# Patient Record
Sex: FEMALE | Race: ASIAN | NOT HISPANIC OR LATINO | URBAN - METROPOLITAN AREA
[De-identification: names, ages, dates, MRNs, and addresses within clinical notes are randomized per-mention and may not be internally consistent; named-entity substitution may affect disease eponyms.]

---

## 2023-03-28 ENCOUNTER — EMERGENCY (EMERGENCY)
Facility: HOSPITAL | Age: 33
LOS: 1 days | Discharge: HOME | End: 2023-03-28
Attending: STUDENT IN AN ORGANIZED HEALTH CARE EDUCATION/TRAINING PROGRAM
Payer: COMMERCIAL

## 2023-03-28 VITALS
WEIGHT: 220.02 LBS | HEART RATE: 86 BPM | DIASTOLIC BLOOD PRESSURE: 67 MMHG | SYSTOLIC BLOOD PRESSURE: 101 MMHG | RESPIRATION RATE: 19 BRPM | OXYGEN SATURATION: 100 % | TEMPERATURE: 98 F

## 2023-03-28 LAB
ALBUMIN SERPL ELPH-MCNC: 3.5 G/DL — SIGNIFICANT CHANGE UP (ref 3.5–5)
ALP SERPL-CCNC: 56 U/L — SIGNIFICANT CHANGE UP (ref 40–120)
ALT FLD-CCNC: 22 U/L DA — SIGNIFICANT CHANGE UP (ref 10–60)
ANION GAP SERPL CALC-SCNC: 5 MMOL/L — SIGNIFICANT CHANGE UP (ref 5–17)
APPEARANCE UR: CLEAR — SIGNIFICANT CHANGE UP
AST SERPL-CCNC: 12 U/L — SIGNIFICANT CHANGE UP (ref 10–40)
BACTERIA # UR AUTO: ABNORMAL /HPF
BASOPHILS # BLD AUTO: 0.03 K/UL — SIGNIFICANT CHANGE UP (ref 0–0.2)
BASOPHILS NFR BLD AUTO: 0.3 % — SIGNIFICANT CHANGE UP (ref 0–2)
BILIRUB SERPL-MCNC: 0.3 MG/DL — SIGNIFICANT CHANGE UP (ref 0.2–1.2)
BILIRUB UR-MCNC: NEGATIVE — SIGNIFICANT CHANGE UP
BLD GP AB SCN SERPL QL: SIGNIFICANT CHANGE UP
BUN SERPL-MCNC: 9 MG/DL — SIGNIFICANT CHANGE UP (ref 7–18)
CALCIUM SERPL-MCNC: 9.4 MG/DL — SIGNIFICANT CHANGE UP (ref 8.4–10.5)
CHLORIDE SERPL-SCNC: 102 MMOL/L — SIGNIFICANT CHANGE UP (ref 96–108)
CO2 SERPL-SCNC: 26 MMOL/L — SIGNIFICANT CHANGE UP (ref 22–31)
COLOR SPEC: YELLOW — SIGNIFICANT CHANGE UP
CREAT SERPL-MCNC: 0.66 MG/DL — SIGNIFICANT CHANGE UP (ref 0.5–1.3)
DIFF PNL FLD: ABNORMAL
EGFR: 117 ML/MIN/1.73M2 — SIGNIFICANT CHANGE UP
EOSINOPHIL # BLD AUTO: 0.08 K/UL — SIGNIFICANT CHANGE UP (ref 0–0.5)
EOSINOPHIL NFR BLD AUTO: 0.8 % — SIGNIFICANT CHANGE UP (ref 0–6)
EPI CELLS # UR: ABNORMAL /HPF
GLUCOSE SERPL-MCNC: 103 MG/DL — HIGH (ref 70–99)
GLUCOSE UR QL: NEGATIVE — SIGNIFICANT CHANGE UP
HCG SERPL-ACNC: HIGH MIU/ML
HCT VFR BLD CALC: 36.9 % — SIGNIFICANT CHANGE UP (ref 34.5–45)
HGB BLD-MCNC: 12.4 G/DL — SIGNIFICANT CHANGE UP (ref 11.5–15.5)
HYALINE CASTS # UR AUTO: ABNORMAL /LPF
IMM GRANULOCYTES NFR BLD AUTO: 0.2 % — SIGNIFICANT CHANGE UP (ref 0–0.9)
KETONES UR-MCNC: NEGATIVE — SIGNIFICANT CHANGE UP
LEUKOCYTE ESTERASE UR-ACNC: ABNORMAL
LYMPHOCYTES # BLD AUTO: 2.98 K/UL — SIGNIFICANT CHANGE UP (ref 1–3.3)
LYMPHOCYTES # BLD AUTO: 30 % — SIGNIFICANT CHANGE UP (ref 13–44)
MCHC RBC-ENTMCNC: 28.1 PG — SIGNIFICANT CHANGE UP (ref 27–34)
MCHC RBC-ENTMCNC: 33.6 GM/DL — SIGNIFICANT CHANGE UP (ref 32–36)
MCV RBC AUTO: 83.7 FL — SIGNIFICANT CHANGE UP (ref 80–100)
MONOCYTES # BLD AUTO: 0.62 K/UL — SIGNIFICANT CHANGE UP (ref 0–0.9)
MONOCYTES NFR BLD AUTO: 6.3 % — SIGNIFICANT CHANGE UP (ref 2–14)
NEUTROPHILS # BLD AUTO: 6.19 K/UL — SIGNIFICANT CHANGE UP (ref 1.8–7.4)
NEUTROPHILS NFR BLD AUTO: 62.4 % — SIGNIFICANT CHANGE UP (ref 43–77)
NITRITE UR-MCNC: NEGATIVE — SIGNIFICANT CHANGE UP
NRBC # BLD: 0 /100 WBCS — SIGNIFICANT CHANGE UP (ref 0–0)
PH UR: 6 — SIGNIFICANT CHANGE UP (ref 5–8)
PLATELET # BLD AUTO: 311 K/UL — SIGNIFICANT CHANGE UP (ref 150–400)
POTASSIUM SERPL-MCNC: 3.9 MMOL/L — SIGNIFICANT CHANGE UP (ref 3.5–5.3)
POTASSIUM SERPL-SCNC: 3.9 MMOL/L — SIGNIFICANT CHANGE UP (ref 3.5–5.3)
PROT SERPL-MCNC: 8.4 G/DL — HIGH (ref 6–8.3)
PROT UR-MCNC: 15 MG/DL
RBC # BLD: 4.41 M/UL — SIGNIFICANT CHANGE UP (ref 3.8–5.2)
RBC # FLD: 13.4 % — SIGNIFICANT CHANGE UP (ref 10.3–14.5)
RBC CASTS # UR COMP ASSIST: ABNORMAL /HPF (ref 0–2)
SODIUM SERPL-SCNC: 133 MMOL/L — LOW (ref 135–145)
SP GR SPEC: 1.01 — SIGNIFICANT CHANGE UP (ref 1.01–1.02)
UROBILINOGEN FLD QL: NEGATIVE — SIGNIFICANT CHANGE UP
WBC # BLD: 9.92 K/UL — SIGNIFICANT CHANGE UP (ref 3.8–10.5)
WBC # FLD AUTO: 9.92 K/UL — SIGNIFICANT CHANGE UP (ref 3.8–10.5)
WBC UR QL: ABNORMAL /HPF (ref 0–5)

## 2023-03-28 PROCEDURE — 86900 BLOOD TYPING SEROLOGIC ABO: CPT

## 2023-03-28 PROCEDURE — 76801 OB US < 14 WKS SINGLE FETUS: CPT | Mod: 26

## 2023-03-28 PROCEDURE — 81001 URINALYSIS AUTO W/SCOPE: CPT

## 2023-03-28 PROCEDURE — 99284 EMERGENCY DEPT VISIT MOD MDM: CPT

## 2023-03-28 PROCEDURE — 87186 SC STD MICRODIL/AGAR DIL: CPT

## 2023-03-28 PROCEDURE — 87184 SC STD DISK METHOD PER PLATE: CPT

## 2023-03-28 PROCEDURE — 86850 RBC ANTIBODY SCREEN: CPT

## 2023-03-28 PROCEDURE — 76801 OB US < 14 WKS SINGLE FETUS: CPT

## 2023-03-28 PROCEDURE — 87077 CULTURE AEROBIC IDENTIFY: CPT

## 2023-03-28 PROCEDURE — 80053 COMPREHEN METABOLIC PANEL: CPT

## 2023-03-28 PROCEDURE — 84702 CHORIONIC GONADOTROPIN TEST: CPT

## 2023-03-28 PROCEDURE — 86901 BLOOD TYPING SEROLOGIC RH(D): CPT

## 2023-03-28 PROCEDURE — 87086 URINE CULTURE/COLONY COUNT: CPT

## 2023-03-28 PROCEDURE — 36415 COLL VENOUS BLD VENIPUNCTURE: CPT

## 2023-03-28 PROCEDURE — 85025 COMPLETE CBC W/AUTO DIFF WBC: CPT

## 2023-03-28 NOTE — ED PROVIDER NOTE - NSFOLLOWUPINSTRUCTIONS_ED_ALL_ED_FT
Rest and stay well hydrated.     Follow up with your OBGYN in 1-3 days.     Take over the counter acetaminophen (Tylenol) 650-1000 mg every 4-6 hours as needed for pain. Do not take more than 3000 mg in a 24 hour period. Be aware many over the counter and prescription medications also contain acetaminophen (Tylenol).     Return with any new or worsening symptoms including heavy vaginal bleeding (>1 pad per hour), severe pain, chest pain, shortness of breath, dizziness, fainting, or any additional concerns.

## 2023-03-28 NOTE — ED PROVIDER NOTE - PROGRESS NOTE DETAILS
Ludin: discussed lab/imaging results with pt. Discussed pelvic rest, OBGYN follow up/return precautions, pt understood and agreeable with plan.

## 2023-03-28 NOTE — ED PROVIDER NOTE - CLINICAL SUMMARY MEDICAL DECISION MAKING FREE TEXT BOX
Ludin: 33 year old female  approximately 11 weeks pregnant (LMP ~23) presents with vaginal spotting today. Pt states she wiped and noticed a small amount of blood from vagina when wiping. Denies any fevers, chest pain, shortness of breath, abdominal pain, vomiting, diarrhea, bloody stools, black tarry stools, dysuria, headache, vision change, numbness, weakness, or rash. Denies any recent injury or trauma. Denies any aspirin or AC use. Patient states she had confirmation of pregnancy with US, pregnancy uncomplicated to date. Abdomen nontender, pt hemodynamically stable in NAD. No bleeding elsewhere to suggest coagulopathy or thrombocytopenia. Will obtain labs, imaging confirm IUP with dispo pending workup.

## 2023-03-28 NOTE — ED ADULT TRIAGE NOTE - CHIEF COMPLAINT QUOTE
Lauren Esqueda called in requesting to speak with on call to review pt labs  Paged Dr Мария Leon via TC 
11 weeks pregnant c/o spotting that started 1/2 hour ago

## 2023-03-28 NOTE — ED ADULT TRIAGE NOTE - MODE OF ARRIVAL
Detail Level: Simple Initiate Treatment: Olux e Samples Given: Olux e foam bid x2 weeks Walk in Private Auto

## 2023-03-28 NOTE — ED ADULT NURSE NOTE - NS ED NURSE DC INFO COMPLEXITY
-TLC placed during cardiac arrest removed, and new site for yvonne placement used.  Time spent on procedure independent of Critical Care time spent at the bedside Time spent on procedure independent of Critical Care time spent at the bedside -TLC placed during code blue. Once patient had ROSC, a VBG was sent from the central line, indicating a PO2 of 248 showing was in artery. Central line was threafter not accessed and was converted to arterial line on SPCU arrival  Time spent on procedure independent of Critical Care time spent at the bedside Time spent on procedure independent of Critical Care time spent at the bedside Simple: Patient demonstrates quick and easy understanding/Verbalized Understanding

## 2023-03-28 NOTE — ED ADULT NURSE NOTE - OBJECTIVE STATEMENT
Pt presents to the ED with c/o vaginal spotting today. Pt is 11 weeks pregnant. Denies fever, vomiting, or abdominal pain.

## 2023-03-28 NOTE — ED PROVIDER NOTE - OBJECTIVE STATEMENT
33 year old female  approximately 11 weeks pregnant (LMP ~23) presents with vaginal spotting today. Pt states she wiped and noticed a small amount of blood from vagina when wiping. Denies any fevers, chest pain, shortness of breath, abdominal pain, vomiting, diarrhea, bloody stools, black tarry stools, dysuria, headache, vision change, numbness, weakness, or rash. Denies any recent injury or trauma. Denies any aspirin or AC use. Patient states she had confirmation of pregnancy with US, pregnancy uncomplicated to date. Denies any additional complaints.

## 2023-03-28 NOTE — ED PROVIDER NOTE - PATIENT PORTAL LINK FT
You can access the FollowMyHealth Patient Portal offered by [facility] by registering at the following website: [email or web address]. By joining [employer]’s FollowMyHealth portal, you will also be able to view your health information using other applications (apps) compatible with our system.

## 2023-03-30 LAB
-  AMIKACIN: SIGNIFICANT CHANGE UP
-  AMPICILLIN/SULBACTAM: SIGNIFICANT CHANGE UP
-  CEFEPIME: SIGNIFICANT CHANGE UP
-  CEFTAZIDIME: SIGNIFICANT CHANGE UP
-  CEFTRIAXONE: SIGNIFICANT CHANGE UP
-  CIPROFLOXACIN: SIGNIFICANT CHANGE UP
-  GENTAMICIN: SIGNIFICANT CHANGE UP
-  IMIPENEM: SIGNIFICANT CHANGE UP
-  LEVOFLOXACIN: SIGNIFICANT CHANGE UP
-  MEROPENEM: SIGNIFICANT CHANGE UP
-  PIPERACILLIN/TAZOBACTAM: SIGNIFICANT CHANGE UP
-  TOBRAMYCIN: SIGNIFICANT CHANGE UP
-  TRIMETHOPRIM/SULFAMETHOXAZOLE: SIGNIFICANT CHANGE UP
CULTURE RESULTS: SIGNIFICANT CHANGE UP
METHOD TYPE: SIGNIFICANT CHANGE UP
METHOD TYPE: SIGNIFICANT CHANGE UP
ORGANISM # SPEC MICROSCOPIC CNT: SIGNIFICANT CHANGE UP
SPECIMEN SOURCE: SIGNIFICANT CHANGE UP

## 2023-04-04 RX ORDER — LEVOFLOXACIN 5 MG/ML
1 INJECTION, SOLUTION INTRAVENOUS
Qty: 7 | Refills: 0
Start: 2023-04-04 | End: 2023-04-10

## 2023-09-28 ENCOUNTER — OUTPATIENT (OUTPATIENT)
Dept: OUTPATIENT SERVICES | Facility: HOSPITAL | Age: 33
LOS: 1 days | End: 2023-09-28
Payer: COMMERCIAL

## 2023-09-28 DIAGNOSIS — O26.899 OTHER SPECIFIED PREGNANCY RELATED CONDITIONS, UNSPECIFIED TRIMESTER: ICD-10-CM

## 2023-09-28 DIAGNOSIS — Z3A.00 WEEKS OF GESTATION OF PREGNANCY NOT SPECIFIED: ICD-10-CM

## 2023-09-28 PROBLEM — G40.909 EPILEPSY, UNSPECIFIED, NOT INTRACTABLE, WITHOUT STATUS EPILEPTICUS: Chronic | Status: ACTIVE | Noted: 2023-03-28

## 2023-09-28 LAB — AMNISURE ROM (RUPTURE OF MEMBRANES): NEGATIVE — SIGNIFICANT CHANGE UP

## 2023-09-28 PROCEDURE — G0463: CPT

## 2023-09-28 PROCEDURE — 76819 FETAL BIOPHYS PROFIL W/O NST: CPT

## 2023-09-28 PROCEDURE — 59025 FETAL NON-STRESS TEST: CPT

## 2023-09-28 PROCEDURE — 84112 EVAL AMNIOTIC FLUID PROTEIN: CPT

## 2023-09-28 PROCEDURE — 76819 FETAL BIOPHYS PROFIL W/O NST: CPT | Mod: 26

## 2023-10-01 ENCOUNTER — OUTPATIENT (OUTPATIENT)
Dept: OUTPATIENT SERVICES | Facility: HOSPITAL | Age: 33
LOS: 1 days | End: 2023-10-01
Payer: COMMERCIAL

## 2023-10-01 DIAGNOSIS — O26.899 OTHER SPECIFIED PREGNANCY RELATED CONDITIONS, UNSPECIFIED TRIMESTER: ICD-10-CM

## 2023-10-01 DIAGNOSIS — Z3A.00 WEEKS OF GESTATION OF PREGNANCY NOT SPECIFIED: ICD-10-CM

## 2023-10-01 LAB
ALBUMIN SERPL ELPH-MCNC: 2.6 G/DL — LOW (ref 3.5–5)
ALP SERPL-CCNC: 124 U/L — HIGH (ref 40–120)
ALT FLD-CCNC: 22 U/L DA — SIGNIFICANT CHANGE UP (ref 10–60)
ANION GAP SERPL CALC-SCNC: 6 MMOL/L — SIGNIFICANT CHANGE UP (ref 5–17)
APPEARANCE UR: ABNORMAL
AST SERPL-CCNC: 16 U/L — SIGNIFICANT CHANGE UP (ref 10–40)
BACTERIA # UR AUTO: ABNORMAL /HPF
BASOPHILS # BLD AUTO: 0.03 K/UL — SIGNIFICANT CHANGE UP (ref 0–0.2)
BASOPHILS NFR BLD AUTO: 0.3 % — SIGNIFICANT CHANGE UP (ref 0–2)
BILIRUB SERPL-MCNC: 0.3 MG/DL — SIGNIFICANT CHANGE UP (ref 0.2–1.2)
BILIRUB UR-MCNC: NEGATIVE — SIGNIFICANT CHANGE UP
BUN SERPL-MCNC: 6 MG/DL — LOW (ref 7–18)
CALCIUM SERPL-MCNC: 9.2 MG/DL — SIGNIFICANT CHANGE UP (ref 8.4–10.5)
CHLORIDE SERPL-SCNC: 107 MMOL/L — SIGNIFICANT CHANGE UP (ref 96–108)
CO2 SERPL-SCNC: 22 MMOL/L — SIGNIFICANT CHANGE UP (ref 22–31)
COLOR SPEC: YELLOW — SIGNIFICANT CHANGE UP
CREAT SERPL-MCNC: 0.62 MG/DL — SIGNIFICANT CHANGE UP (ref 0.5–1.3)
DIFF PNL FLD: ABNORMAL
EGFR: 119 ML/MIN/1.73M2 — SIGNIFICANT CHANGE UP
EOSINOPHIL # BLD AUTO: 0.01 K/UL — SIGNIFICANT CHANGE UP (ref 0–0.5)
EOSINOPHIL NFR BLD AUTO: 0.1 % — SIGNIFICANT CHANGE UP (ref 0–6)
EPI CELLS # UR: PRESENT
GLUCOSE SERPL-MCNC: 107 MG/DL — HIGH (ref 70–99)
GLUCOSE UR QL: NEGATIVE MG/DL — SIGNIFICANT CHANGE UP
HCT VFR BLD CALC: 31.9 % — LOW (ref 34.5–45)
HGB BLD-MCNC: 10.6 G/DL — LOW (ref 11.5–15.5)
IMM GRANULOCYTES NFR BLD AUTO: 1.1 % — HIGH (ref 0–0.9)
KETONES UR-MCNC: NEGATIVE MG/DL — SIGNIFICANT CHANGE UP
LEUKOCYTE ESTERASE UR-ACNC: ABNORMAL
LYMPHOCYTES # BLD AUTO: 1.35 K/UL — SIGNIFICANT CHANGE UP (ref 1–3.3)
LYMPHOCYTES # BLD AUTO: 14.5 % — SIGNIFICANT CHANGE UP (ref 13–44)
MCHC RBC-ENTMCNC: 27.9 PG — SIGNIFICANT CHANGE UP (ref 27–34)
MCHC RBC-ENTMCNC: 33.2 GM/DL — SIGNIFICANT CHANGE UP (ref 32–36)
MCV RBC AUTO: 83.9 FL — SIGNIFICANT CHANGE UP (ref 80–100)
MONOCYTES # BLD AUTO: 1.11 K/UL — HIGH (ref 0–0.9)
MONOCYTES NFR BLD AUTO: 11.9 % — SIGNIFICANT CHANGE UP (ref 2–14)
NEUTROPHILS # BLD AUTO: 6.7 K/UL — SIGNIFICANT CHANGE UP (ref 1.8–7.4)
NEUTROPHILS NFR BLD AUTO: 72.1 % — SIGNIFICANT CHANGE UP (ref 43–77)
NITRITE UR-MCNC: NEGATIVE — SIGNIFICANT CHANGE UP
NRBC # BLD: 0 /100 WBCS — SIGNIFICANT CHANGE UP (ref 0–0)
PH UR: 6 — SIGNIFICANT CHANGE UP (ref 5–8)
PLATELET # BLD AUTO: 236 K/UL — SIGNIFICANT CHANGE UP (ref 150–400)
POTASSIUM SERPL-MCNC: 3.9 MMOL/L — SIGNIFICANT CHANGE UP (ref 3.5–5.3)
POTASSIUM SERPL-SCNC: 3.9 MMOL/L — SIGNIFICANT CHANGE UP (ref 3.5–5.3)
PROT SERPL-MCNC: 6.7 G/DL — SIGNIFICANT CHANGE UP (ref 6–8.3)
PROT UR-MCNC: NEGATIVE MG/DL — SIGNIFICANT CHANGE UP
RAPID RVP RESULT: DETECTED
RBC # BLD: 3.8 M/UL — SIGNIFICANT CHANGE UP (ref 3.8–5.2)
RBC # FLD: 13.9 % — SIGNIFICANT CHANGE UP (ref 10.3–14.5)
RBC CASTS # UR COMP ASSIST: 4 /HPF — SIGNIFICANT CHANGE UP (ref 0–4)
SARS-COV-2 RNA SPEC QL NAA+PROBE: DETECTED
SODIUM SERPL-SCNC: 135 MMOL/L — SIGNIFICANT CHANGE UP (ref 135–145)
SP GR SPEC: 1.01 — SIGNIFICANT CHANGE UP (ref 1–1.03)
UROBILINOGEN FLD QL: 0.2 MG/DL — SIGNIFICANT CHANGE UP (ref 0.2–1)
WBC # BLD: 9.3 K/UL — SIGNIFICANT CHANGE UP (ref 3.8–10.5)
WBC # FLD AUTO: 9.3 K/UL — SIGNIFICANT CHANGE UP (ref 3.8–10.5)
WBC UR QL: 85 /HPF — HIGH (ref 0–5)

## 2023-10-01 PROCEDURE — 0225U NFCT DS DNA&RNA 21 SARSCOV2: CPT

## 2023-10-01 PROCEDURE — 80053 COMPREHEN METABOLIC PANEL: CPT

## 2023-10-01 PROCEDURE — 59025 FETAL NON-STRESS TEST: CPT

## 2023-10-01 PROCEDURE — 81001 URINALYSIS AUTO W/SCOPE: CPT

## 2023-10-01 PROCEDURE — G0463: CPT

## 2023-10-01 PROCEDURE — 87086 URINE CULTURE/COLONY COUNT: CPT

## 2023-10-01 PROCEDURE — 85025 COMPLETE CBC W/AUTO DIFF WBC: CPT

## 2023-10-01 PROCEDURE — 36415 COLL VENOUS BLD VENIPUNCTURE: CPT

## 2023-10-01 RX ORDER — SODIUM CHLORIDE 9 MG/ML
1000 INJECTION, SOLUTION INTRAVENOUS ONCE
Refills: 0 | Status: COMPLETED | OUTPATIENT
Start: 2023-10-01 | End: 2023-10-01

## 2023-10-01 RX ORDER — BENZOCAINE AND MENTHOL 5; 1 G/100ML; G/100ML
1 LIQUID ORAL
Refills: 0 | Status: DISCONTINUED | OUTPATIENT
Start: 2023-10-01 | End: 2023-10-16

## 2023-10-01 RX ORDER — ACETAMINOPHEN 500 MG
975 TABLET ORAL EVERY 6 HOURS
Refills: 0 | Status: DISCONTINUED | OUTPATIENT
Start: 2023-10-01 | End: 2023-10-16

## 2023-10-01 RX ORDER — CEPHALEXIN 500 MG
1 CAPSULE ORAL
Qty: 14 | Refills: 0
Start: 2023-10-01 | End: 2023-10-07

## 2023-10-01 RX ORDER — CEFTRIAXONE 500 MG/1
1000 INJECTION, POWDER, FOR SOLUTION INTRAMUSCULAR; INTRAVENOUS EVERY 24 HOURS
Refills: 0 | Status: DISCONTINUED | OUTPATIENT
Start: 2023-10-01 | End: 2023-10-16

## 2023-10-01 RX ADMIN — Medication 975 MILLIGRAM(S): at 19:30

## 2023-10-01 RX ADMIN — CEFTRIAXONE 100 MILLIGRAM(S): 500 INJECTION, POWDER, FOR SOLUTION INTRAMUSCULAR; INTRAVENOUS at 20:04

## 2023-10-01 RX ADMIN — SODIUM CHLORIDE 1000 MILLILITER(S): 9 INJECTION, SOLUTION INTRAVENOUS at 17:53

## 2023-10-01 RX ADMIN — BENZOCAINE AND MENTHOL 1 LOZENGE: 5; 1 LIQUID ORAL at 20:04

## 2023-10-01 RX ADMIN — Medication 975 MILLIGRAM(S): at 20:30

## 2023-10-02 RX ORDER — CEPHALEXIN 500 MG
1 CAPSULE ORAL
Qty: 14 | Refills: 0
Start: 2023-10-02 | End: 2023-10-08

## 2023-10-03 LAB
CULTURE RESULTS: SIGNIFICANT CHANGE UP
SPECIMEN SOURCE: SIGNIFICANT CHANGE UP

## 2023-10-11 ENCOUNTER — INPATIENT (INPATIENT)
Facility: HOSPITAL | Age: 33
LOS: 2 days | Discharge: HOME | End: 2023-10-14
Attending: OBSTETRICS & GYNECOLOGY | Admitting: OBSTETRICS & GYNECOLOGY
Payer: COMMERCIAL

## 2023-10-11 VITALS — HEIGHT: 68 IN | WEIGHT: 246.92 LBS

## 2023-10-11 DIAGNOSIS — O26.899 OTHER SPECIFIED PREGNANCY RELATED CONDITIONS, UNSPECIFIED TRIMESTER: ICD-10-CM

## 2023-10-11 DIAGNOSIS — Z3A.00 WEEKS OF GESTATION OF PREGNANCY NOT SPECIFIED: ICD-10-CM

## 2023-10-11 DIAGNOSIS — Z34.80 ENCOUNTER FOR SUPERVISION OF OTHER NORMAL PREGNANCY, UNSPECIFIED TRIMESTER: ICD-10-CM

## 2023-10-11 LAB
APTT BLD: 32.1 SEC — SIGNIFICANT CHANGE UP (ref 24.5–35.6)
BASOPHILS # BLD AUTO: 0.03 K/UL — SIGNIFICANT CHANGE UP (ref 0–0.2)
BASOPHILS NFR BLD AUTO: 0.3 % — SIGNIFICANT CHANGE UP (ref 0–2)
EOSINOPHIL # BLD AUTO: 0.04 K/UL — SIGNIFICANT CHANGE UP (ref 0–0.5)
EOSINOPHIL NFR BLD AUTO: 0.4 % — SIGNIFICANT CHANGE UP (ref 0–6)
FIBRINOGEN PPP-MCNC: 523 MG/DL — HIGH (ref 200–475)
HCT VFR BLD CALC: 33.7 % — LOW (ref 34.5–45)
HGB BLD-MCNC: 11.5 G/DL — SIGNIFICANT CHANGE UP (ref 11.5–15.5)
HIV 1 & 2 AB SERPL IA.RAPID: SIGNIFICANT CHANGE UP
IMM GRANULOCYTES NFR BLD AUTO: 2.9 % — HIGH (ref 0–0.9)
INR BLD: 0.95 RATIO — SIGNIFICANT CHANGE UP (ref 0.85–1.18)
LYMPHOCYTES # BLD AUTO: 2.7 K/UL — SIGNIFICANT CHANGE UP (ref 1–3.3)
LYMPHOCYTES # BLD AUTO: 25.3 % — SIGNIFICANT CHANGE UP (ref 13–44)
MCHC RBC-ENTMCNC: 28.5 PG — SIGNIFICANT CHANGE UP (ref 27–34)
MCHC RBC-ENTMCNC: 34.1 GM/DL — SIGNIFICANT CHANGE UP (ref 32–36)
MCV RBC AUTO: 83.4 FL — SIGNIFICANT CHANGE UP (ref 80–100)
MONOCYTES # BLD AUTO: 0.68 K/UL — SIGNIFICANT CHANGE UP (ref 0–0.9)
MONOCYTES NFR BLD AUTO: 6.4 % — SIGNIFICANT CHANGE UP (ref 2–14)
NEUTROPHILS # BLD AUTO: 6.93 K/UL — SIGNIFICANT CHANGE UP (ref 1.8–7.4)
NEUTROPHILS NFR BLD AUTO: 64.7 % — SIGNIFICANT CHANGE UP (ref 43–77)
NRBC # BLD: 0 /100 WBCS — SIGNIFICANT CHANGE UP (ref 0–0)
PLATELET # BLD AUTO: 303 K/UL — SIGNIFICANT CHANGE UP (ref 150–400)
PROTHROM AB SERPL-ACNC: 10.8 SEC — SIGNIFICANT CHANGE UP (ref 9.5–13)
RBC # BLD: 4.04 M/UL — SIGNIFICANT CHANGE UP (ref 3.8–5.2)
RBC # FLD: 13.8 % — SIGNIFICANT CHANGE UP (ref 10.3–14.5)
WBC # BLD: 10.69 K/UL — HIGH (ref 3.8–10.5)
WBC # FLD AUTO: 10.69 K/UL — HIGH (ref 3.8–10.5)

## 2023-10-11 RX ORDER — CITRIC ACID/SODIUM CITRATE 300-500 MG
15 SOLUTION, ORAL ORAL EVERY 6 HOURS
Refills: 0 | Status: DISCONTINUED | OUTPATIENT
Start: 2023-10-11 | End: 2023-10-12

## 2023-10-11 RX ORDER — LEVOTHYROXINE SODIUM 125 MCG
100 TABLET ORAL DAILY
Refills: 0 | Status: DISCONTINUED | OUTPATIENT
Start: 2023-10-11 | End: 2023-10-14

## 2023-10-11 RX ORDER — LEVETIRACETAM 250 MG/1
400 TABLET, FILM COATED ORAL
Refills: 0 | Status: DISCONTINUED | OUTPATIENT
Start: 2023-10-11 | End: 2023-10-11

## 2023-10-11 RX ORDER — INFLUENZA VIRUS VACCINE 15; 15; 15; 15 UG/.5ML; UG/.5ML; UG/.5ML; UG/.5ML
0.5 SUSPENSION INTRAMUSCULAR ONCE
Refills: 0 | Status: DISCONTINUED | OUTPATIENT
Start: 2023-10-11 | End: 2023-10-14

## 2023-10-11 RX ORDER — OXYTOCIN 10 UNIT/ML
VIAL (ML) INJECTION
Qty: 30 | Refills: 0 | Status: DISCONTINUED | OUTPATIENT
Start: 2023-10-11 | End: 2023-10-12

## 2023-10-11 RX ORDER — SODIUM CHLORIDE 9 MG/ML
1000 INJECTION, SOLUTION INTRAVENOUS
Refills: 0 | Status: DISCONTINUED | OUTPATIENT
Start: 2023-10-11 | End: 2023-10-12

## 2023-10-11 RX ORDER — CHLORHEXIDINE GLUCONATE 213 G/1000ML
1 SOLUTION TOPICAL DAILY
Refills: 0 | Status: DISCONTINUED | OUTPATIENT
Start: 2023-10-11 | End: 2023-10-12

## 2023-10-11 RX ORDER — LAMOTRIGINE 25 MG/1
400 TABLET, ORALLY DISINTEGRATING ORAL EVERY 12 HOURS
Refills: 0 | Status: DISCONTINUED | OUTPATIENT
Start: 2023-10-11 | End: 2023-10-14

## 2023-10-11 RX ADMIN — SODIUM CHLORIDE 125 MILLILITER(S): 9 INJECTION, SOLUTION INTRAVENOUS at 16:40

## 2023-10-11 RX ADMIN — LAMOTRIGINE 400 MILLIGRAM(S): 25 TABLET, ORALLY DISINTEGRATING ORAL at 22:39

## 2023-10-11 RX ADMIN — Medication 975 MILLIGRAM(S): at 22:00

## 2023-10-11 NOTE — PATIENT PROFILE OB - AS LABOR ROM TYPE
spontaneous rupture Initiate Treatment: clobetasol 0.05 % topical ointment Bid\\nQuantity: 60.0 g  Days Supply: 30\\nSig: Apply to aa twice daily x 2 weeks, then repeat prn for flares Detail Level: Zone Render In Strict Bullet Format?: No

## 2023-10-12 LAB
ANION GAP SERPL CALC-SCNC: 12 MMOL/L — SIGNIFICANT CHANGE UP (ref 5–17)
APTT BLD: 28.9 SEC — SIGNIFICANT CHANGE UP (ref 24.5–35.6)
BASOPHILS # BLD AUTO: 0.07 K/UL — SIGNIFICANT CHANGE UP (ref 0–0.2)
BASOPHILS NFR BLD AUTO: 0.2 % — SIGNIFICANT CHANGE UP (ref 0–2)
BASOPHILS NFR BLD AUTO: 0.2 % — SIGNIFICANT CHANGE UP (ref 0–2)
BASOPHILS NFR BLD AUTO: 0.3 % — SIGNIFICANT CHANGE UP (ref 0–2)
BUN SERPL-MCNC: 9 MG/DL — SIGNIFICANT CHANGE UP (ref 7–18)
CALCIUM SERPL-MCNC: 7.8 MG/DL — LOW (ref 8.4–10.5)
CHLORIDE SERPL-SCNC: 113 MMOL/L — HIGH (ref 96–108)
CO2 SERPL-SCNC: 15 MMOL/L — LOW (ref 22–31)
CREAT SERPL-MCNC: 0.84 MG/DL — SIGNIFICANT CHANGE UP (ref 0.5–1.3)
CREAT SERPL-MCNC: 0.84 MG/DL — SIGNIFICANT CHANGE UP (ref 0.5–1.3)
EGFR: 93 ML/MIN/1.73M2 — SIGNIFICANT CHANGE UP
EGFR: 93 ML/MIN/1.73M2 — SIGNIFICANT CHANGE UP
EOSINOPHIL # BLD AUTO: 0 K/UL — SIGNIFICANT CHANGE UP (ref 0–0.5)
EOSINOPHIL # BLD AUTO: 0 K/UL — SIGNIFICANT CHANGE UP (ref 0–0.5)
EOSINOPHIL # BLD AUTO: 0.01 K/UL — SIGNIFICANT CHANGE UP (ref 0–0.5)
EOSINOPHIL NFR BLD AUTO: 0 % — SIGNIFICANT CHANGE UP (ref 0–6)
GLUCOSE SERPL-MCNC: 163 MG/DL — HIGH (ref 70–99)
HBV SURFACE AG SER-ACNC: SIGNIFICANT CHANGE UP
HBV SURFACE AG SERPL QL IA: SIGNIFICANT CHANGE UP
HCT VFR BLD CALC: 25.5 % — LOW (ref 34.5–45)
HCT VFR BLD CALC: 27.4 % — LOW (ref 34.5–45)
HCT VFR BLD CALC: 29.1 % — LOW (ref 34.5–45)
HGB BLD-MCNC: 8.6 G/DL — LOW (ref 11.5–15.5)
HGB BLD-MCNC: 9.2 G/DL — LOW (ref 11.5–15.5)
HGB BLD-MCNC: 9.8 G/DL — LOW (ref 11.5–15.5)
IMM GRANULOCYTES NFR BLD AUTO: 0.9 % — SIGNIFICANT CHANGE UP (ref 0–0.9)
INR BLD: 1.03 RATIO — SIGNIFICANT CHANGE UP (ref 0.85–1.18)
LYMPHOCYTES # BLD AUTO: 11 % — LOW (ref 13–44)
LYMPHOCYTES # BLD AUTO: 11 % — LOW (ref 13–44)
LYMPHOCYTES # BLD AUTO: 11.2 % — LOW (ref 13–44)
LYMPHOCYTES # BLD AUTO: 3.02 K/UL — SIGNIFICANT CHANGE UP (ref 1–3.3)
LYMPHOCYTES # BLD AUTO: 3.29 K/UL — SIGNIFICANT CHANGE UP (ref 1–3.3)
LYMPHOCYTES # BLD AUTO: 3.29 K/UL — SIGNIFICANT CHANGE UP (ref 1–3.3)
MCHC RBC-ENTMCNC: 27.9 PG — SIGNIFICANT CHANGE UP (ref 27–34)
MCHC RBC-ENTMCNC: 28 PG — SIGNIFICANT CHANGE UP (ref 27–34)
MCHC RBC-ENTMCNC: 28.3 PG — SIGNIFICANT CHANGE UP (ref 27–34)
MCHC RBC-ENTMCNC: 33.6 GM/DL — SIGNIFICANT CHANGE UP (ref 32–36)
MCHC RBC-ENTMCNC: 33.7 GM/DL — SIGNIFICANT CHANGE UP (ref 32–36)
MCHC RBC-ENTMCNC: 33.7 GM/DL — SIGNIFICANT CHANGE UP (ref 32–36)
MCV RBC AUTO: 82.9 FL — SIGNIFICANT CHANGE UP (ref 80–100)
MCV RBC AUTO: 83.5 FL — SIGNIFICANT CHANGE UP (ref 80–100)
MCV RBC AUTO: 83.9 FL — SIGNIFICANT CHANGE UP (ref 80–100)
MONOCYTES # BLD AUTO: 1.64 K/UL — HIGH (ref 0–0.9)
MONOCYTES # BLD AUTO: 2.02 K/UL — HIGH (ref 0–0.9)
MONOCYTES # BLD AUTO: 2.02 K/UL — HIGH (ref 0–0.9)
MONOCYTES NFR BLD AUTO: 6.1 % — SIGNIFICANT CHANGE UP (ref 2–14)
MONOCYTES NFR BLD AUTO: 6.8 % — SIGNIFICANT CHANGE UP (ref 2–14)
MONOCYTES NFR BLD AUTO: 6.8 % — SIGNIFICANT CHANGE UP (ref 2–14)
NEUTROPHILS # BLD AUTO: 21.87 K/UL — HIGH (ref 1.8–7.4)
NEUTROPHILS # BLD AUTO: 24.15 K/UL — HIGH (ref 1.8–7.4)
NEUTROPHILS # BLD AUTO: 24.15 K/UL — HIGH (ref 1.8–7.4)
NEUTROPHILS NFR BLD AUTO: 81.1 % — HIGH (ref 43–77)
NEUTROPHILS NFR BLD AUTO: 81.1 % — HIGH (ref 43–77)
NEUTROPHILS NFR BLD AUTO: 81.5 % — HIGH (ref 43–77)
NRBC # BLD: 0 /100 WBCS — SIGNIFICANT CHANGE UP (ref 0–0)
PLATELET # BLD AUTO: 219 K/UL — SIGNIFICANT CHANGE UP (ref 150–400)
PLATELET # BLD AUTO: 231 K/UL — SIGNIFICANT CHANGE UP (ref 150–400)
PLATELET # BLD AUTO: 261 K/UL — SIGNIFICANT CHANGE UP (ref 150–400)
POTASSIUM SERPL-MCNC: 3.6 MMOL/L — SIGNIFICANT CHANGE UP (ref 3.5–5.3)
POTASSIUM SERPL-SCNC: 3.6 MMOL/L — SIGNIFICANT CHANGE UP (ref 3.5–5.3)
PROTHROM AB SERPL-ACNC: 11.7 SEC — SIGNIFICANT CHANGE UP (ref 9.5–13)
RBC # BLD: 3.04 M/UL — LOW (ref 3.8–5.2)
RBC # BLD: 3.28 M/UL — LOW (ref 3.8–5.2)
RBC # BLD: 3.51 M/UL — LOW (ref 3.8–5.2)
RBC # FLD: 14.1 % — SIGNIFICANT CHANGE UP (ref 10.3–14.5)
RBC # FLD: 14.2 % — SIGNIFICANT CHANGE UP (ref 10.3–14.5)
RBC # FLD: 14.6 % — HIGH (ref 10.3–14.5)
RUBV IGG SER-ACNC: 6.8 INDEX — SIGNIFICANT CHANGE UP
RUBV IGG SER-IMP: POSITIVE — SIGNIFICANT CHANGE UP
SODIUM SERPL-SCNC: 140 MMOL/L — SIGNIFICANT CHANGE UP (ref 135–145)
T PALLIDUM AB TITR SER: NEGATIVE — SIGNIFICANT CHANGE UP
WBC # BLD: 18.06 K/UL — HIGH (ref 3.8–10.5)
WBC # BLD: 26.85 K/UL — HIGH (ref 3.8–10.5)
WBC # BLD: 29.8 K/UL — HIGH (ref 3.8–10.5)
WBC # FLD AUTO: 18.06 K/UL — HIGH (ref 3.8–10.5)
WBC # FLD AUTO: 26.85 K/UL — HIGH (ref 3.8–10.5)
WBC # FLD AUTO: 29.8 K/UL — HIGH (ref 3.8–10.5)

## 2023-10-12 RX ORDER — HYDROCORTISONE 1 %
1 OINTMENT (GRAM) TOPICAL EVERY 6 HOURS
Refills: 0 | Status: DISCONTINUED | OUTPATIENT
Start: 2023-10-12 | End: 2023-10-14

## 2023-10-12 RX ORDER — OXYCODONE HYDROCHLORIDE 5 MG/1
5 TABLET ORAL
Refills: 0 | Status: DISCONTINUED | OUTPATIENT
Start: 2023-10-12 | End: 2023-10-14

## 2023-10-12 RX ORDER — SIMETHICONE 80 MG/1
80 TABLET, CHEWABLE ORAL EVERY 4 HOURS
Refills: 0 | Status: DISCONTINUED | OUTPATIENT
Start: 2023-10-12 | End: 2023-10-14

## 2023-10-12 RX ORDER — SODIUM CHLORIDE 9 MG/ML
3 INJECTION INTRAMUSCULAR; INTRAVENOUS; SUBCUTANEOUS EVERY 8 HOURS
Refills: 0 | Status: DISCONTINUED | OUTPATIENT
Start: 2023-10-12 | End: 2023-10-14

## 2023-10-12 RX ORDER — MAGNESIUM HYDROXIDE 400 MG/1
30 TABLET, CHEWABLE ORAL
Refills: 0 | Status: DISCONTINUED | OUTPATIENT
Start: 2023-10-12 | End: 2023-10-14

## 2023-10-12 RX ORDER — KETOROLAC TROMETHAMINE 30 MG/ML
30 SYRINGE (ML) INJECTION ONCE
Refills: 0 | Status: DISCONTINUED | OUTPATIENT
Start: 2023-10-12 | End: 2023-10-14

## 2023-10-12 RX ORDER — TETANUS TOXOID, REDUCED DIPHTHERIA TOXOID AND ACELLULAR PERTUSSIS VACCINE, ADSORBED 5; 2.5; 8; 8; 2.5 [IU]/.5ML; [IU]/.5ML; UG/.5ML; UG/.5ML; UG/.5ML
0.5 SUSPENSION INTRAMUSCULAR ONCE
Refills: 0 | Status: DISCONTINUED | OUTPATIENT
Start: 2023-10-12 | End: 2023-10-14

## 2023-10-12 RX ORDER — DIPHENHYDRAMINE HCL 50 MG
25 CAPSULE ORAL EVERY 6 HOURS
Refills: 0 | Status: DISCONTINUED | OUTPATIENT
Start: 2023-10-12 | End: 2023-10-14

## 2023-10-12 RX ORDER — PRAMOXINE HYDROCHLORIDE 150 MG/15G
1 AEROSOL, FOAM RECTAL EVERY 4 HOURS
Refills: 0 | Status: DISCONTINUED | OUTPATIENT
Start: 2023-10-12 | End: 2023-10-14

## 2023-10-12 RX ORDER — IBUPROFEN 200 MG
600 TABLET ORAL EVERY 6 HOURS
Refills: 0 | Status: DISCONTINUED | OUTPATIENT
Start: 2023-10-12 | End: 2023-10-14

## 2023-10-12 RX ORDER — SENNA PLUS 8.6 MG/1
2 TABLET ORAL AT BEDTIME
Refills: 0 | Status: DISCONTINUED | OUTPATIENT
Start: 2023-10-12 | End: 2023-10-14

## 2023-10-12 RX ORDER — BENZOCAINE 10 %
1 GEL (GRAM) MUCOUS MEMBRANE EVERY 6 HOURS
Refills: 0 | Status: DISCONTINUED | OUTPATIENT
Start: 2023-10-12 | End: 2023-10-14

## 2023-10-12 RX ORDER — LANOLIN
1 OINTMENT (GRAM) TOPICAL EVERY 6 HOURS
Refills: 0 | Status: DISCONTINUED | OUTPATIENT
Start: 2023-10-12 | End: 2023-10-14

## 2023-10-12 RX ORDER — ACETAMINOPHEN 500 MG
975 TABLET ORAL EVERY 6 HOURS
Refills: 0 | Status: DISCONTINUED | OUTPATIENT
Start: 2023-10-12 | End: 2023-10-14

## 2023-10-12 RX ORDER — OXYTOCIN 10 UNIT/ML
41.67 VIAL (ML) INJECTION
Qty: 20 | Refills: 0 | Status: DISCONTINUED | OUTPATIENT
Start: 2023-10-12 | End: 2023-10-14

## 2023-10-12 RX ORDER — DIBUCAINE 1 %
1 OINTMENT (GRAM) RECTAL EVERY 6 HOURS
Refills: 0 | Status: DISCONTINUED | OUTPATIENT
Start: 2023-10-12 | End: 2023-10-14

## 2023-10-12 RX ORDER — AER TRAVELER 0.5 G/1
1 SOLUTION RECTAL; TOPICAL EVERY 4 HOURS
Refills: 0 | Status: DISCONTINUED | OUTPATIENT
Start: 2023-10-12 | End: 2023-10-14

## 2023-10-12 RX ADMIN — LAMOTRIGINE 400 MILLIGRAM(S): 25 TABLET, ORALLY DISINTEGRATING ORAL at 21:26

## 2023-10-12 RX ADMIN — Medication 975 MILLIGRAM(S): at 21:25

## 2023-10-12 RX ADMIN — SODIUM CHLORIDE 3 MILLILITER(S): 9 INJECTION INTRAMUSCULAR; INTRAVENOUS; SUBCUTANEOUS at 23:51

## 2023-10-12 RX ADMIN — LAMOTRIGINE 400 MILLIGRAM(S): 25 TABLET, ORALLY DISINTEGRATING ORAL at 09:31

## 2023-10-12 RX ADMIN — SODIUM CHLORIDE 3 MILLILITER(S): 9 INJECTION INTRAMUSCULAR; INTRAVENOUS; SUBCUTANEOUS at 11:25

## 2023-10-12 RX ADMIN — Medication 125 MILLIUNIT(S)/MIN: at 06:42

## 2023-10-12 RX ADMIN — Medication 100 MICROGRAM(S): at 09:31

## 2023-10-12 NOTE — CHART NOTE - NSCHARTNOTEFT_GEN_A_CORE
OB PA Focused Note    Call bell pulled by patient's nurse   OB team at bedside, with Dr. Hellen collier attending  Patient's family at bedside  patient vomited, however conscious. Patient sat up to use the bathroom after transfusion completed, felt dizziness and everything "blacked out" however never LOC, patient lay back and started to vomit. nurse put patient on her side and push the bell. patient and family said this is not how she behaves in a seizure. Patient and family state she did not have a seizure she was just dizzy. Noted sheets to be soiled with urine.  Lamictal was increased one week ago  cbc/bmp stat  seizure precautions  suspected vasovagal  continue to observe in labor room  dw Dr. Hellen collier attending at bedside
Patient evaluated at bedside one hour post vaso-vagal episode.  Patient is resting no complaints, vitals stable, afebrile.  Hgb~9 s/p 2uPRBC, continue present management.

## 2023-10-13 LAB
BASOPHILS # BLD AUTO: 0.07 K/UL — SIGNIFICANT CHANGE UP (ref 0–0.2)
BASOPHILS NFR BLD AUTO: 0.3 % — SIGNIFICANT CHANGE UP (ref 0–2)
EOSINOPHIL # BLD AUTO: 0.08 K/UL — SIGNIFICANT CHANGE UP (ref 0–0.5)
EOSINOPHIL NFR BLD AUTO: 0.3 % — SIGNIFICANT CHANGE UP (ref 0–6)
HCT VFR BLD CALC: 27.4 % — LOW (ref 34.5–45)
HGB BLD-MCNC: 9.3 G/DL — LOW (ref 11.5–15.5)
IMM GRANULOCYTES NFR BLD AUTO: 0.9 % — SIGNIFICANT CHANGE UP (ref 0–0.9)
LYMPHOCYTES # BLD AUTO: 19.2 % — SIGNIFICANT CHANGE UP (ref 13–44)
LYMPHOCYTES # BLD AUTO: 4.47 K/UL — HIGH (ref 1–3.3)
MCHC RBC-ENTMCNC: 27.9 PG — SIGNIFICANT CHANGE UP (ref 27–34)
MCHC RBC-ENTMCNC: 33.9 GM/DL — SIGNIFICANT CHANGE UP (ref 32–36)
MCV RBC AUTO: 82.3 FL — SIGNIFICANT CHANGE UP (ref 80–100)
MONOCYTES # BLD AUTO: 1.5 K/UL — HIGH (ref 0–0.9)
MONOCYTES NFR BLD AUTO: 6.4 % — SIGNIFICANT CHANGE UP (ref 2–14)
NEUTROPHILS # BLD AUTO: 16.97 K/UL — HIGH (ref 1.8–7.4)
NEUTROPHILS NFR BLD AUTO: 72.9 % — SIGNIFICANT CHANGE UP (ref 43–77)
NRBC # BLD: 0 /100 WBCS — SIGNIFICANT CHANGE UP (ref 0–0)
PLATELET # BLD AUTO: 234 K/UL — SIGNIFICANT CHANGE UP (ref 150–400)
RBC # BLD: 3.33 M/UL — LOW (ref 3.8–5.2)
RBC # FLD: 15.1 % — HIGH (ref 10.3–14.5)
WBC # BLD: 23.31 K/UL — HIGH (ref 3.8–10.5)
WBC # FLD AUTO: 23.31 K/UL — HIGH (ref 3.8–10.5)

## 2023-10-13 RX ADMIN — PRAMOXINE HYDROCHLORIDE 1 APPLICATION(S): 150 AEROSOL, FOAM RECTAL at 09:17

## 2023-10-13 RX ADMIN — Medication 1 APPLICATION(S): at 09:17

## 2023-10-13 RX ADMIN — SODIUM CHLORIDE 3 MILLILITER(S): 9 INJECTION INTRAMUSCULAR; INTRAVENOUS; SUBCUTANEOUS at 22:18

## 2023-10-13 RX ADMIN — SENNA PLUS 2 TABLET(S): 8.6 TABLET ORAL at 22:16

## 2023-10-13 RX ADMIN — LAMOTRIGINE 400 MILLIGRAM(S): 25 TABLET, ORALLY DISINTEGRATING ORAL at 09:16

## 2023-10-13 RX ADMIN — Medication 600 MILLIGRAM(S): at 22:17

## 2023-10-13 RX ADMIN — SODIUM CHLORIDE 3 MILLILITER(S): 9 INJECTION INTRAMUSCULAR; INTRAVENOUS; SUBCUTANEOUS at 06:51

## 2023-10-13 RX ADMIN — AER TRAVELER 1 APPLICATION(S): 0.5 SOLUTION RECTAL; TOPICAL at 09:15

## 2023-10-13 RX ADMIN — SODIUM CHLORIDE 3 MILLILITER(S): 9 INJECTION INTRAMUSCULAR; INTRAVENOUS; SUBCUTANEOUS at 14:24

## 2023-10-13 RX ADMIN — MAGNESIUM HYDROXIDE 30 MILLILITER(S): 400 TABLET, CHEWABLE ORAL at 09:19

## 2023-10-13 RX ADMIN — LAMOTRIGINE 400 MILLIGRAM(S): 25 TABLET, ORALLY DISINTEGRATING ORAL at 22:16

## 2023-10-13 RX ADMIN — Medication 1 TABLET(S): at 12:18

## 2023-10-13 RX ADMIN — Medication 100 MICROGRAM(S): at 06:47

## 2023-10-13 RX ADMIN — Medication 600 MILLIGRAM(S): at 23:17

## 2023-10-13 RX ADMIN — Medication 600 MILLIGRAM(S): at 12:19

## 2023-10-13 RX ADMIN — Medication 600 MILLIGRAM(S): at 13:19

## 2023-10-13 NOTE — LACTATION INITIAL EVALUATION - LACTATION INTERVENTIONS
Breastfeeding on cue 8-12X/24 hours with diaper count to assess for adequate intake, safe skin to skin and rooming-in encouraged. pt. Supplements with formula; safe formula feeding/prep inst. provided; attended mother-baby breastfeeding and d/c class this morning in prep for possible d/c home tomorrow; pt's questions addressed; declined Referral to [facility] Tele-lactation program for breastfeeding f/u and support/initiate/review safe skin-to-skin/initiate/review hand expression/initiate/review techniques for position and latch/initiate/review supplementation plan due to medical indications/review techniques to increase milk supply/reviewed components of an effective feeding and at least 8 effective feedings per day required/reviewed importance of monitoring infant diapers, the breastfeeding log, and minimum output each day/reviewed benefits and recommendations for rooming in/reviewed feeding on demand/by cue at least 8 times a day

## 2023-10-14 VITALS — TEMPERATURE: 98 F | OXYGEN SATURATION: 100 % | RESPIRATION RATE: 18 BRPM

## 2023-10-14 LAB
BASOPHILS # BLD AUTO: 0.04 K/UL — SIGNIFICANT CHANGE UP (ref 0–0.2)
BASOPHILS NFR BLD AUTO: 0.3 % — SIGNIFICANT CHANGE UP (ref 0–2)
EOSINOPHIL # BLD AUTO: 0.15 K/UL — SIGNIFICANT CHANGE UP (ref 0–0.5)
EOSINOPHIL NFR BLD AUTO: 1 % — SIGNIFICANT CHANGE UP (ref 0–6)
HCT VFR BLD CALC: 22 % — LOW (ref 34.5–45)
HCT VFR BLD CALC: 23.9 % — LOW (ref 34.5–45)
HGB BLD-MCNC: 7.2 G/DL — LOW (ref 11.5–15.5)
HGB BLD-MCNC: 8 G/DL — LOW (ref 11.5–15.5)
IMM GRANULOCYTES NFR BLD AUTO: 1.4 % — HIGH (ref 0–0.9)
LYMPHOCYTES # BLD AUTO: 25.7 % — SIGNIFICANT CHANGE UP (ref 13–44)
LYMPHOCYTES # BLD AUTO: 3.92 K/UL — HIGH (ref 1–3.3)
MCHC RBC-ENTMCNC: 27.8 PG — SIGNIFICANT CHANGE UP (ref 27–34)
MCHC RBC-ENTMCNC: 28.1 PG — SIGNIFICANT CHANGE UP (ref 27–34)
MCHC RBC-ENTMCNC: 32.7 GM/DL — SIGNIFICANT CHANGE UP (ref 32–36)
MCHC RBC-ENTMCNC: 33.5 GM/DL — SIGNIFICANT CHANGE UP (ref 32–36)
MCV RBC AUTO: 83.9 FL — SIGNIFICANT CHANGE UP (ref 80–100)
MCV RBC AUTO: 84.9 FL — SIGNIFICANT CHANGE UP (ref 80–100)
MONOCYTES # BLD AUTO: 0.88 K/UL — SIGNIFICANT CHANGE UP (ref 0–0.9)
MONOCYTES NFR BLD AUTO: 5.8 % — SIGNIFICANT CHANGE UP (ref 2–14)
NEUTROPHILS # BLD AUTO: 10.06 K/UL — HIGH (ref 1.8–7.4)
NEUTROPHILS NFR BLD AUTO: 65.8 % — SIGNIFICANT CHANGE UP (ref 43–77)
NRBC # BLD: 0 /100 WBCS — SIGNIFICANT CHANGE UP (ref 0–0)
NRBC # BLD: 0 /100 WBCS — SIGNIFICANT CHANGE UP (ref 0–0)
PLATELET # BLD AUTO: 233 K/UL — SIGNIFICANT CHANGE UP (ref 150–400)
PLATELET # BLD AUTO: 237 K/UL — SIGNIFICANT CHANGE UP (ref 150–400)
RBC # BLD: 2.59 M/UL — LOW (ref 3.8–5.2)
RBC # BLD: 2.85 M/UL — LOW (ref 3.8–5.2)
RBC # FLD: 15 % — HIGH (ref 10.3–14.5)
RBC # FLD: 15 % — HIGH (ref 10.3–14.5)
WBC # BLD: 14.35 K/UL — HIGH (ref 3.8–10.5)
WBC # BLD: 15.27 K/UL — HIGH (ref 3.8–10.5)
WBC # FLD AUTO: 14.35 K/UL — HIGH (ref 3.8–10.5)
WBC # FLD AUTO: 15.27 K/UL — HIGH (ref 3.8–10.5)

## 2023-10-14 PROCEDURE — 85610 PROTHROMBIN TIME: CPT

## 2023-10-14 PROCEDURE — 86901 BLOOD TYPING SEROLOGIC RH(D): CPT

## 2023-10-14 PROCEDURE — 86900 BLOOD TYPING SEROLOGIC ABO: CPT

## 2023-10-14 PROCEDURE — 86762 RUBELLA ANTIBODY: CPT

## 2023-10-14 PROCEDURE — 59025 FETAL NON-STRESS TEST: CPT

## 2023-10-14 PROCEDURE — 59050 FETAL MONITOR W/REPORT: CPT

## 2023-10-14 PROCEDURE — 80175 DRUG SCREEN QUAN LAMOTRIGINE: CPT

## 2023-10-14 PROCEDURE — 85730 THROMBOPLASTIN TIME PARTIAL: CPT

## 2023-10-14 PROCEDURE — 36430 TRANSFUSION BLD/BLD COMPNT: CPT

## 2023-10-14 PROCEDURE — 86703 HIV-1/HIV-2 1 RESULT ANTBDY: CPT

## 2023-10-14 PROCEDURE — 86850 RBC ANTIBODY SCREEN: CPT

## 2023-10-14 PROCEDURE — G0463: CPT

## 2023-10-14 PROCEDURE — 85384 FIBRINOGEN ACTIVITY: CPT

## 2023-10-14 PROCEDURE — 86780 TREPONEMA PALLIDUM: CPT

## 2023-10-14 PROCEDURE — P9040: CPT

## 2023-10-14 PROCEDURE — 87340 HEPATITIS B SURFACE AG IA: CPT

## 2023-10-14 PROCEDURE — 85027 COMPLETE CBC AUTOMATED: CPT

## 2023-10-14 PROCEDURE — 86923 COMPATIBILITY TEST ELECTRIC: CPT

## 2023-10-14 PROCEDURE — 36415 COLL VENOUS BLD VENIPUNCTURE: CPT

## 2023-10-14 PROCEDURE — 85025 COMPLETE CBC W/AUTO DIFF WBC: CPT

## 2023-10-14 PROCEDURE — 80048 BASIC METABOLIC PNL TOTAL CA: CPT

## 2023-10-14 RX ORDER — FERROUS SULFATE 325(65) MG
5 TABLET ORAL
Qty: 300 | Refills: 0
Start: 2023-10-14 | End: 2023-11-12

## 2023-10-14 RX ORDER — IBUPROFEN 200 MG
1 TABLET ORAL
Qty: 20 | Refills: 0
Start: 2023-10-14 | End: 2023-10-18

## 2023-10-14 RX ORDER — SENNA PLUS 8.6 MG/1
2 TABLET ORAL ONCE
Refills: 0 | Status: COMPLETED | OUTPATIENT
Start: 2023-10-14 | End: 2023-10-14

## 2023-10-14 RX ORDER — LEVOTHYROXINE SODIUM 125 MCG
1 TABLET ORAL
Qty: 0 | Refills: 0 | DISCHARGE
Start: 2023-10-14

## 2023-10-14 RX ADMIN — Medication 1 TABLET(S): at 14:37

## 2023-10-14 RX ADMIN — SENNA PLUS 2 TABLET(S): 8.6 TABLET ORAL at 14:37

## 2023-10-14 RX ADMIN — LAMOTRIGINE 400 MILLIGRAM(S): 25 TABLET, ORALLY DISINTEGRATING ORAL at 09:16

## 2023-10-14 RX ADMIN — SODIUM CHLORIDE 3 MILLILITER(S): 9 INJECTION INTRAMUSCULAR; INTRAVENOUS; SUBCUTANEOUS at 06:03

## 2023-10-14 RX ADMIN — Medication 100 MICROGRAM(S): at 06:16

## 2023-10-14 NOTE — DISCHARGE NOTE OB - SWOLLEN AREA ON THE LEG THAT IS PAINFUL, RED OR HOT
Statement Selected Medical Necessity Statement: Based on my medical judgement, Mohs surgery is the most appropriate treatment for this cancer compared to other treatments.

## 2023-10-14 NOTE — PROGRESS NOTE ADULT - SUBJECTIVE AND OBJECTIVE BOX
Patient seen at bedside resting comfortably offers no current complaints. Pt denies SOB, dizziness, Chest pain or heavy bleeding.  Ambulating and voiding without difficulty.  Passing flatus/BM and tolerating regular diet.  both breast/bottle feeding.  Denies HA, N/V/D, palpitations, worsening abdominal pain, worsening vaginal bleeding, or any other concerns.      Vital Signs Last 24 Hrs  T(C): 36.7 (14 Oct 2023 08:29), Max: 36.7 (13 Oct 2023 12:25)  T(F): 98 (14 Oct 2023 08:29), Max: 98 (13 Oct 2023 12:25)  HR: 78 (14 Oct 2023 05:35) (78 - 102)  BP: 101/63 (14 Oct 2023 05:35) (101/63 - 109/69)  BP(mean): --  RR: 18 (14 Oct 2023 08:29) (17 - 18)  SpO2: 100% (14 Oct 2023 08:29) (98% - 100%)    Orthostatic VS    10-14-23 @ 08:29  Lying BP: Orthostatic BP (Lying Systolic): 105/Orthostatic BP (Lying Diastolic (mm Hg)): 67 HR: Orthostatic Pulse (Heart Rate (beats/min)): 75   Sitting BP: Orthostatic BP (Sitting Systolic): 108/Orthostatic BP (Sitting Diastolic (mm Hg)): 70 HR: Orthostatic Pulse (Heart Rate (beats/min)): 82  Standing BP: Orthostatic BP (Standing Systolic): 106/Orthostatic BP (Standing Diastolic (mm Hg)): 69 HR: Orthostatic Pulse (Heart Rate (beats/min)): 85  Site: Orthostatic BP/Pulse (Site): upper right arm   Mode: Orthostatic BP/ Pulse (Mode): electronic    Parameters below as of 14 Oct 2023 08:29  Patient On (Oxygen Delivery Method): room air        Physical Exam:     Gen: A&Ox 3, NAD  Chest: CTA B/L  Cardiac: S1,S2; RRR  Breast: Soft, nontender, nonengorged  Abdomen: +BS; Soft, nontender, ND; Fundus firm below umbilicus  Gyn: Min lochia  Ext: Nontender, DTRS 2+, no worsening edema                          7.2    15.27 )-----------( 233      ( 14 Oct 2023 06:12 )             22.0        
OBGYN PA Note  Patient seen at bedside resting comfortably offers no current complaints. Ambulating and voiding without difficulty. Passing flatus and tolerating regular diet.  both breast/bottle feeding . Denies HA, CP, SOB, N/V/D, dizziness, palpitations,  worsening vaginal bleeding, or any other concerns.      Vital Signs Last 24 Hrs  T(C): 36.7 (13 Oct 2023 08:23), Max: 37.1 (12 Oct 2023 13:54)  T(F): 98 (13 Oct 2023 08:23), Max: 98.8 (12 Oct 2023 13:54)  HR: 82 (13 Oct 2023 08:23) (82 - 110)  BP: 120/72 (13 Oct 2023 08:23) (94/55 - 120/72)  BP(mean): 75 (12 Oct 2023 13:54) (70 - 75)  RR: 17 (13 Oct 2023 08:23) (16 - 19)  SpO2: 98% (13 Oct 2023 08:23) (98% - 100%)    Parameters below as of 13 Oct 2023 08:23  Patient On (Oxygen Delivery Method): room air        Physical Exam:     Gen: A&Ox 3, NAD  Chest: CTA B/L  Cardiac: S1,S2; RRR  Breast: Soft, nontender, nonengorged  Abdomen: +BS, Soft, nontender, ND; Fundus firm below umbilicus  Gyn: mod lochia  Ext: Nontender, DTRS 2+, no worsening edema                          9.3    23.31 )-----------( 234      ( 13 Oct 2023 06:28 )             27.4       A/P:  PPD#1 s/p   -Continue pain management  -Encourage OOB and ambulation  -Check CBC  -Continue current care  -Plan for discharge tomorrow  -d/w ***

## 2023-10-14 NOTE — DISCHARGE NOTE OB - NS MD DC FALL RISK RISK
For information on Fall & Injury Prevention, visit: [email or web address] OR  [email or web address] OR  https://www.cdc.gov/steadi/patient.html

## 2023-10-14 NOTE — DISCHARGE NOTE OB - MEDICATION SUMMARY - MEDICATIONS TO TAKE
I will START or STAY ON the medications listed below when I get home from the hospital:    ibuprofen 600 mg oral tablet  -- 1 tab(s) by mouth every 6 hours MDD: 4  -- Indication: For pain med    Prenatal Multivitamins with Folic Acid 1 mg oral tablet  -- 1 tab(s) by mouth once a day  -- Indication: For prenatal vitamin    ferrous sulfate 300 mg/5 mL (60 mg/5 mL elemental iron) oral liquid  -- 5 milliliter(s) by mouth 2 times a day MDD: 2  -- Indication: For iron    levothyroxine 100 mcg (0.1 mg) oral tablet  -- 1 tab(s) by mouth once a day  -- Indication: For Synthroid

## 2023-10-14 NOTE — DISCHARGE NOTE OB - CARE PROVIDER_API CALL
Teddy Leija  Obstetrics and Gynecology  [address], [address]  [place] [address]  Phone: [phone]  Fax: [phone]  Established Patient  Follow Up Time: 1 month

## 2023-10-14 NOTE — DISCHARGE NOTE OB - HOSPITAL COURSE
Pt admitted for labor at 40wks . Pt had a vaginal delivery with a post partum hemorrhage due to vaginal laceration .  Pt received 2 units of packed red blood cells for acute blood loss anemia.   pt had routine post partum care and was discharged to home.

## 2023-10-14 NOTE — DISCHARGE NOTE OB - PLAN OF CARE
prenatal vitamin and iron bid  FERROL or prescription. Post partum care: Nothing in the vagina for 5-6 weeks. No tampons, no tub baths, no douching or sex. Continue taking your prenatal vitamins as long as you are breastfeeding or at least for 6 weeks. Follow up in 5-6 weeks for your post partum check up.

## 2023-10-14 NOTE — DISCHARGE NOTE OB - CARE PLAN
1 Principal Discharge DX:	Encounter for vaginal delivery  Assessment and plan of treatment:	Post partum care: Nothing in the vagina for 5-6 weeks. No tampons, no tub baths, no douching or sex. Continue taking your prenatal vitamins as long as you are breastfeeding or at least for 6 weeks. Follow up in 5-6 weeks for your post partum check up.  Secondary Diagnosis:	Anemia due to acute blood loss  Assessment and plan of treatment:	prenatal vitamin and iron bid  FERROL or prescription.

## 2023-10-14 NOTE — PROGRESS NOTE ADULT - ASSESSMENT
A/P:  PPD#2 s/p     s/p PPH with Acute blood loss anemia s/p 2 units PRBC  Pt doing well meeting all PP goals, NO dizziness or SOB due to anemia with stable orthostatic vitals.      - Discharge home with instructions  -Follow up in office in 5 weeks for postpartum visit  -Breastfeeding encouraged   - prenatal vitamins, Ferrous BID  -d/w dr. Hidalgo

## 2023-10-14 NOTE — DISCHARGE NOTE OB - MEDICATION SUMMARY - MEDICATIONS TO STOP TAKING
I will STOP taking the medications listed below when I get home from the hospital:    cephalexin 500 mg oral capsule  -- 1 cap(s) by mouth every 12 hours    levoFLOXacin 750 mg oral tablet  -- 1 tab(s) by mouth once a day

## 2023-10-14 NOTE — DISCHARGE NOTE OB - PATIENT PORTAL LINK FT
You can access the FollowMyHealth Patient Portal offered by [facility] by registering at the following website: [email or web address]. By joining [employer]’s FollowMyHealth portal, you will also be able to view your health information using other applications (apps) compatible with our system.

## 2023-10-14 NOTE — DISCHARGE NOTE OB - MATERIALS PROVIDED
Vaccinations/[facility]  Screening Program/  Immunization Record/Breastfeeding Mother’s Support Group Information/Guide to Postpartum Care/[facility] Hearing Screen Program/Back To Sleep Handout/Shaken Baby Prevention Handout/Breastfeeding Guide and Packet/Birth Certificate Instructions/Discharge Medication Information for Patients and Families Pocket Guide/Letter of Medical Neccessity

## 2023-10-16 LAB
LAMOTRIGINE SERPL-MCNC: 4.9 UG/ML — SIGNIFICANT CHANGE UP (ref 2–20)
LAMOTRIGINE SERPL-MCNC: 4.9 UG/ML — SIGNIFICANT CHANGE UP (ref 2–20)

## 2024-02-12 NOTE — PATIENT PROFILE OB - NS PRO AD PATIENT TYPE ON CHART
47 y.o. female with history of unspecified heart murmur, palpitations with regular cardiac rhythm, HTN, HLD, scoliosis, plasma cell neoplasm, IgA Kappa MGUS, orthostatic hypotension, and newly diagnosed convulsive syncope transferred from [facility] with concerns for seizure-like activity. She received 2mg ativan IV via EMS for seizure-like activity while in route to [place] ED.  - Admit to [facility]. Transfer to [facility] planned 2/4.   - neuro checks, vitals, I/Os  - 24 hour vEEG  - Patient was recently admitted to the hospital on 1/26/2024 for similar episode. 24 hour EEG was negative at that time, AEDs were discontinued, and she was diagnosed with convulsive syncope.   - Toxic screen at OSH negative  - WBC elevated at 15, but AF. BCx from OSH pending  - CTH from OSH with no acute intracranial abnormalities.   - MRI Brain W/WO Contrast (1/27/24) reviewed, which showed nonspecific stable scattered supratentorial white matter lesions. Consider repeat MRI.  - Loaded w/ 3g Keppra IV at OSH, start maintenance at 500mg bid changed to brivaracetam.   - SBP < 180  - s/p  Cardene gtt, weaned off in [facility]  - PRN labetalol, hydralazine  - PRN pain, nausea medications   - PT/OT as appropriate   - VTE Prophylaxis: mechanical, hold chemical in acute phase    - NPO until Speech therapy eval     Full Code    2/7- stable thus far on brivaracetam since transfer from [facility] but developed TD and chorea like movements in the US. Neuro consult pending  LP pending  Per neurology concern for paraneoplastic process and will start PLEX 2/10 and solumedrol  Discontinue briviact 50 mg BID and switch to lacosamide 100 mg BID (cerebellar ataxia is a rare side effect of briviact)    Health Care Proxy (HCP)

## 2024-12-25 ENCOUNTER — INPATIENT (INPATIENT)
Facility: HOSPITAL | Age: 34
LOS: 0 days | Discharge: HOME | DRG: 833 | End: 2024-12-25
Attending: OBSTETRICS & GYNECOLOGY | Admitting: OBSTETRICS & GYNECOLOGY
Payer: COMMERCIAL

## 2024-12-25 VITALS
WEIGHT: 220.9 LBS | RESPIRATION RATE: 18 BRPM | HEIGHT: 67 IN | OXYGEN SATURATION: 99 % | DIASTOLIC BLOOD PRESSURE: 76 MMHG | SYSTOLIC BLOOD PRESSURE: 122 MMHG | TEMPERATURE: 98 F | HEART RATE: 79 BPM

## 2024-12-25 VITALS
OXYGEN SATURATION: 99 % | HEART RATE: 77 BPM | TEMPERATURE: 98 F | SYSTOLIC BLOOD PRESSURE: 117 MMHG | DIASTOLIC BLOOD PRESSURE: 77 MMHG | RESPIRATION RATE: 18 BRPM

## 2024-12-25 DIAGNOSIS — O00.90 UNSPECIFIED ECTOPIC PREGNANCY WITHOUT INTRAUTERINE PREGNANCY: ICD-10-CM

## 2024-12-25 LAB
ALBUMIN SERPL ELPH-MCNC: 3.7 G/DL — SIGNIFICANT CHANGE UP (ref 3.5–5)
ALP SERPL-CCNC: 54 U/L — SIGNIFICANT CHANGE UP (ref 40–120)
ALT FLD-CCNC: 28 U/L DA — SIGNIFICANT CHANGE UP (ref 10–60)
ANION GAP SERPL CALC-SCNC: 4 MMOL/L — LOW (ref 5–17)
APPEARANCE UR: CLEAR — SIGNIFICANT CHANGE UP
APTT BLD: 37.8 SEC — HIGH (ref 24.5–35.6)
AST SERPL-CCNC: 19 U/L — SIGNIFICANT CHANGE UP (ref 10–40)
BASOPHILS # BLD AUTO: 0.02 K/UL — SIGNIFICANT CHANGE UP (ref 0–0.2)
BASOPHILS NFR BLD AUTO: 0.3 % — SIGNIFICANT CHANGE UP (ref 0–2)
BILIRUB SERPL-MCNC: 0.4 MG/DL — SIGNIFICANT CHANGE UP (ref 0.2–1.2)
BILIRUB UR-MCNC: NEGATIVE — SIGNIFICANT CHANGE UP
BLD GP AB SCN SERPL QL: SIGNIFICANT CHANGE UP
BUN SERPL-MCNC: 8 MG/DL — SIGNIFICANT CHANGE UP (ref 7–18)
CALCIUM SERPL-MCNC: 9 MG/DL — SIGNIFICANT CHANGE UP (ref 8.4–10.5)
CHLORIDE SERPL-SCNC: 107 MMOL/L — SIGNIFICANT CHANGE UP (ref 96–108)
CO2 SERPL-SCNC: 24 MMOL/L — SIGNIFICANT CHANGE UP (ref 22–31)
COLOR SPEC: YELLOW — SIGNIFICANT CHANGE UP
CREAT SERPL-MCNC: 0.72 MG/DL — SIGNIFICANT CHANGE UP (ref 0.5–1.3)
DIFF PNL FLD: NEGATIVE — SIGNIFICANT CHANGE UP
EGFR: 111 ML/MIN/1.73M2 — SIGNIFICANT CHANGE UP
EOSINOPHIL # BLD AUTO: 0.12 K/UL — SIGNIFICANT CHANGE UP (ref 0–0.5)
EOSINOPHIL NFR BLD AUTO: 1.6 % — SIGNIFICANT CHANGE UP (ref 0–6)
FLUAV AG NPH QL: SIGNIFICANT CHANGE UP
FLUBV AG NPH QL: SIGNIFICANT CHANGE UP
GLUCOSE SERPL-MCNC: 105 MG/DL — HIGH (ref 70–99)
GLUCOSE UR QL: NEGATIVE MG/DL — SIGNIFICANT CHANGE UP
HCG SERPL-ACNC: 1925 MIU/ML — HIGH
HCT VFR BLD CALC: 33.3 % — LOW (ref 34.5–45)
HGB BLD-MCNC: 11.3 G/DL — LOW (ref 11.5–15.5)
IMM GRANULOCYTES NFR BLD AUTO: 0.3 % — SIGNIFICANT CHANGE UP (ref 0–0.9)
INR BLD: 1 RATIO — SIGNIFICANT CHANGE UP (ref 0.85–1.16)
KETONES UR-MCNC: NEGATIVE MG/DL — SIGNIFICANT CHANGE UP
LEUKOCYTE ESTERASE UR-ACNC: ABNORMAL
LIDOCAIN IGE QN: 45 U/L — SIGNIFICANT CHANGE UP (ref 13–75)
LYMPHOCYTES # BLD AUTO: 2.21 K/UL — SIGNIFICANT CHANGE UP (ref 1–3.3)
LYMPHOCYTES # BLD AUTO: 29.4 % — SIGNIFICANT CHANGE UP (ref 13–44)
MCHC RBC-ENTMCNC: 28.7 PG — SIGNIFICANT CHANGE UP (ref 27–34)
MCHC RBC-ENTMCNC: 33.9 G/DL — SIGNIFICANT CHANGE UP (ref 32–36)
MCV RBC AUTO: 84.5 FL — SIGNIFICANT CHANGE UP (ref 80–100)
MONOCYTES # BLD AUTO: 0.71 K/UL — SIGNIFICANT CHANGE UP (ref 0–0.9)
MONOCYTES NFR BLD AUTO: 9.4 % — SIGNIFICANT CHANGE UP (ref 2–14)
NEUTROPHILS # BLD AUTO: 4.44 K/UL — SIGNIFICANT CHANGE UP (ref 1.8–7.4)
NEUTROPHILS NFR BLD AUTO: 59 % — SIGNIFICANT CHANGE UP (ref 43–77)
NITRITE UR-MCNC: NEGATIVE — SIGNIFICANT CHANGE UP
NRBC # BLD: 0 /100 WBCS — SIGNIFICANT CHANGE UP (ref 0–0)
PH UR: 6 — SIGNIFICANT CHANGE UP (ref 5–8)
PLATELET # BLD AUTO: 294 K/UL — SIGNIFICANT CHANGE UP (ref 150–400)
POTASSIUM SERPL-MCNC: 3.5 MMOL/L — SIGNIFICANT CHANGE UP (ref 3.5–5.3)
POTASSIUM SERPL-SCNC: 3.5 MMOL/L — SIGNIFICANT CHANGE UP (ref 3.5–5.3)
PROT SERPL-MCNC: 7.6 G/DL — SIGNIFICANT CHANGE UP (ref 6–8.3)
PROT UR-MCNC: NEGATIVE MG/DL — SIGNIFICANT CHANGE UP
PROTHROM AB SERPL-ACNC: 11.6 SEC — SIGNIFICANT CHANGE UP (ref 9.9–13.4)
RBC # BLD: 3.94 M/UL — SIGNIFICANT CHANGE UP (ref 3.8–5.2)
RBC # FLD: 13.3 % — SIGNIFICANT CHANGE UP (ref 10.3–14.5)
RSV RNA NPH QL NAA+NON-PROBE: SIGNIFICANT CHANGE UP
SARS-COV-2 RNA SPEC QL NAA+PROBE: SIGNIFICANT CHANGE UP
SODIUM SERPL-SCNC: 135 MMOL/L — SIGNIFICANT CHANGE UP (ref 135–145)
SP GR SPEC: 1.02 — SIGNIFICANT CHANGE UP (ref 1–1.03)
UROBILINOGEN FLD QL: 0.2 MG/DL — SIGNIFICANT CHANGE UP (ref 0.2–1)
WBC # BLD: 7.52 K/UL — SIGNIFICANT CHANGE UP (ref 3.8–10.5)
WBC # FLD AUTO: 7.52 K/UL — SIGNIFICANT CHANGE UP (ref 3.8–10.5)

## 2024-12-25 PROCEDURE — 85730 THROMBOPLASTIN TIME PARTIAL: CPT

## 2024-12-25 PROCEDURE — 85025 COMPLETE CBC W/AUTO DIFF WBC: CPT

## 2024-12-25 PROCEDURE — 76817 TRANSVAGINAL US OBSTETRIC: CPT

## 2024-12-25 PROCEDURE — 88305 TISSUE EXAM BY PATHOLOGIST: CPT

## 2024-12-25 PROCEDURE — 80053 COMPREHEN METABOLIC PANEL: CPT

## 2024-12-25 PROCEDURE — 83690 ASSAY OF LIPASE: CPT

## 2024-12-25 PROCEDURE — 76817 TRANSVAGINAL US OBSTETRIC: CPT | Mod: 26

## 2024-12-25 PROCEDURE — 84702 CHORIONIC GONADOTROPIN TEST: CPT

## 2024-12-25 PROCEDURE — 96374 THER/PROPH/DIAG INJ IV PUSH: CPT

## 2024-12-25 PROCEDURE — 87637 SARSCOV2&INF A&B&RSV AMP PRB: CPT

## 2024-12-25 PROCEDURE — 76801 OB US < 14 WKS SINGLE FETUS: CPT | Mod: 26

## 2024-12-25 PROCEDURE — 99285 EMERGENCY DEPT VISIT HI MDM: CPT

## 2024-12-25 PROCEDURE — 86901 BLOOD TYPING SEROLOGIC RH(D): CPT

## 2024-12-25 PROCEDURE — 58661 LAPAROSCOPY REMOVE ADNEXA: CPT | Mod: AS,RT

## 2024-12-25 PROCEDURE — C1889: CPT

## 2024-12-25 PROCEDURE — 81001 URINALYSIS AUTO W/SCOPE: CPT

## 2024-12-25 PROCEDURE — 86900 BLOOD TYPING SEROLOGIC ABO: CPT

## 2024-12-25 PROCEDURE — 88305 TISSUE EXAM BY PATHOLOGIST: CPT | Mod: 26

## 2024-12-25 PROCEDURE — 87077 CULTURE AEROBIC IDENTIFY: CPT

## 2024-12-25 PROCEDURE — 86850 RBC ANTIBODY SCREEN: CPT

## 2024-12-25 PROCEDURE — 86923 COMPATIBILITY TEST ELECTRIC: CPT

## 2024-12-25 PROCEDURE — 85610 PROTHROMBIN TIME: CPT

## 2024-12-25 PROCEDURE — 87086 URINE CULTURE/COLONY COUNT: CPT

## 2024-12-25 PROCEDURE — 76801 OB US < 14 WKS SINGLE FETUS: CPT

## 2024-12-25 PROCEDURE — 99291 CRITICAL CARE FIRST HOUR: CPT

## 2024-12-25 PROCEDURE — 36415 COLL VENOUS BLD VENIPUNCTURE: CPT

## 2024-12-25 DEVICE — SURGICEL POWDER 3 GRAMS
Type: IMPLANTABLE DEVICE | Status: NON-FUNCTIONAL
Removed: 2024-12-25

## 2024-12-25 RX ORDER — SODIUM CHLORIDE 9 MG/ML
1000 INJECTION, SOLUTION INTRAVENOUS
Refills: 0 | Status: DISCONTINUED | OUTPATIENT
Start: 2024-12-25 | End: 2024-12-25

## 2024-12-25 RX ORDER — FENTANYL 75 UG/H
50 PATCH, EXTENDED RELEASE TRANSDERMAL
Refills: 0 | Status: DISCONTINUED | OUTPATIENT
Start: 2024-12-25 | End: 2024-12-25

## 2024-12-25 RX ORDER — CEFUROXIME AXETIL 250 MG
250 TABLET ORAL ONCE
Refills: 0 | Status: COMPLETED | OUTPATIENT
Start: 2024-12-25 | End: 2024-12-25

## 2024-12-25 RX ORDER — IBUPROFEN 200 MG
600 TABLET ORAL EVERY 6 HOURS
Refills: 0 | Status: DISCONTINUED | OUTPATIENT
Start: 2024-12-25 | End: 2024-12-25

## 2024-12-25 RX ORDER — LAMOTRIGINE 100 MG/1
1 TABLET ORAL
Refills: 0 | DISCHARGE

## 2024-12-25 RX ORDER — FENTANYL 75 UG/H
25 PATCH, EXTENDED RELEASE TRANSDERMAL
Refills: 0 | Status: DISCONTINUED | OUTPATIENT
Start: 2024-12-25 | End: 2024-12-25

## 2024-12-25 RX ORDER — MORPHINE SULFATE 15 MG
4 TABLET, EXTENDED RELEASE ORAL ONCE
Refills: 0 | Status: DISCONTINUED | OUTPATIENT
Start: 2024-12-25 | End: 2024-12-25

## 2024-12-25 RX ORDER — LEVOTHYROXINE SODIUM 175 UG/1
100 TABLET ORAL DAILY
Refills: 0 | Status: DISCONTINUED | OUTPATIENT
Start: 2024-12-25 | End: 2024-12-25

## 2024-12-25 RX ORDER — ACETAMINOPHEN 80 MG/.8ML
1000 SOLUTION/ DROPS ORAL ONCE
Refills: 0 | Status: COMPLETED | OUTPATIENT
Start: 2024-12-25 | End: 2024-12-25

## 2024-12-25 RX ORDER — CEFUROXIME AXETIL 250 MG
1 TABLET ORAL
Qty: 10 | Refills: 0
Start: 2024-12-25 | End: 2024-12-29

## 2024-12-25 RX ORDER — ACETAMINOPHEN 80 MG/.8ML
650 SOLUTION/ DROPS ORAL ONCE
Refills: 0 | Status: DISCONTINUED | OUTPATIENT
Start: 2024-12-25 | End: 2024-12-25

## 2024-12-25 RX ORDER — IBUPROFEN 200 MG
1 TABLET ORAL
Qty: 20 | Refills: 0
Start: 2024-12-25 | End: 2024-12-29

## 2024-12-25 RX ADMIN — Medication 4 MILLIGRAM(S): at 15:38

## 2024-12-25 RX ADMIN — FENTANYL 25 MICROGRAM(S): 75 PATCH, EXTENDED RELEASE TRANSDERMAL at 15:38

## 2024-12-25 RX ADMIN — ACETAMINOPHEN 1000 MILLIGRAM(S): 80 SOLUTION/ DROPS ORAL at 15:38

## 2024-12-25 RX ADMIN — FENTANYL 25 MICROGRAM(S): 75 PATCH, EXTENDED RELEASE TRANSDERMAL at 14:47

## 2024-12-25 RX ADMIN — ACETAMINOPHEN 400 MILLIGRAM(S): 80 SOLUTION/ DROPS ORAL at 09:15

## 2024-12-25 RX ADMIN — Medication 4 MILLIGRAM(S): at 10:51

## 2024-12-25 RX ADMIN — Medication 250 MILLIGRAM(S): at 08:06

## 2024-12-25 NOTE — ED PROVIDER NOTE - PROGRESS NOTE DETAILS
Received signout on patient from Dr. Calvert.  Discussed ultrasound results with attending radiologist Dr. Newman -no IUP and cystic structures in the right adnexa highly suspicious for ectopic pregnancy.  Patient endorsing ongoing discomfort on the right side consistent with this.  Will administer Ofirmev for pain.  GYN consulted OB/GYN will admit to their service for further management. Discussed case with OBGYN ZION Do, concerned for rupture given discomfort, OB/GYN will admit to their service for further management.

## 2024-12-25 NOTE — DISCHARGE NOTE NURSING/CASE MANAGEMENT/SOCIAL WORK - PATIENT PORTAL LINK FT
You can access the FollowMyHealth Patient Portal offered by [facility] by registering at the following website: [email or web address]. By joining [employer]’s FollowMyHealth portal, you will also be able to view your health information using other applications (apps) compatible with our system.

## 2024-12-25 NOTE — ED PROVIDER NOTE - WET READ LAUNCH FT
[facility]  Day 3 Interdisciplinary Treatment Plan NOTE    Review Date & Time: 10/22/2020   1315      Patient was in treatment team    Admission Type:   Admission Type: Voluntary    Reason for admission:  Reason for Admission: alcohol abuse suicidal thoughts before admit to [facility]  Estimated Length of Stay Update:  10/26/2020  Estimated Discharge Date Update: 10/24/2020    PATIENT STRENGTHS:  Patient Strengths Strengths: Communication, Positive Support, Social Skills, Motivated, Other(Past sobriety with AA.)  Patient Strengths and Limitations:Limitations: Inappropriate/potentially harmful leisure interests, Tendency to isolate self  Addictive Behavior:Addictive Behavior  In the past 3 months, have you felt or has someone told you that you have a problem with:  : Other(Comments)(alcohol)  Do you have a history of Chemical Use?: No  Do you have a history of Alcohol Use?: Yes  Do you have a history of Street Drug Abuse?: No  Histroy of Prescripton Drug Abuse?: No  Medical Problems:  Past Medical History:   Diagnosis Date    Asthma     COPD (chronic obstructive pulmonary disease) ([facility]     Eczema     GERD (gastroesophageal reflux disease)     History of alcohol abuse     History of marijuana use     History of tobacco abuse     quit 11/21/2017    Hx of seasonal allergies     Pancreatitis     Seizures ([facility] [address] [facility]     etoh wdl    Type 2 diabetes mellitus without complication ([facility] 35/83/1385       Risk:  Fall RiskTotal: 83  Alvin Scale Alvin Scale Score: 21  BVC Total: 0        Status EXAM:   Status and Exam  Normal: No  Facial Expression: Exaggerated, Worried  Affect: Blunt  Level of Consciousness: Alert  Mood:Normal: No  Mood: Depressed, Anxious, Worthless, low self-esteem, Ashamed/humiliated  Motor Activity:Normal: No  Motor Activity: Tremors  Interview Behavior: Cooperative, Evasive  Preception: Dennis to Person, Dennis to Time, Dennis to Place, Dennis to Situation  Attention:Normal: No  Attention: Distractible  Thought Processes: Circumstantial  Thought Content:Normal: Yes  Hallucinations: None  Delusions: No  Memory:Normal: No  Memory: Poor Recent  Insight and Judgment: No  Insight and Judgment: Poor Judgment, Poor Insight, Unmotivated  Present Suicidal Ideation: No  Present Homicidal Ideation: No    Daily Assessment Last Entry:   Daily Sleep (WDL): Within Defined Limits         Patient Currently in Pain: Denies  Daily Nutrition (WDL): Within Defined Limits  Appetite Change: Normal for patient  Barriers to Nutrition: Other (Comment)(withdrawal)  Level of Assistance: Independent/Self    Patient Monitoring:  Frequency of Checks: 4 times per hour, close    Psychiatric Symptoms:   Depression Symptoms  Depression Symptoms: Impaired concentration, Isolative, Loss of interest, Feelings of hopelessess, Feelings of worthlessness  Anxiety Symptoms  Anxiety Symptoms: Generalized  Mellisa Symptoms  Mellisa Symptoms: No problems reported or observed. Psychosis Symptoms  Delusion Type: No problems reported or observed. Suicide Risk CSSR-S:  1) Within the past month, have you wished you were dead or wished you could go to sleep and not wake up? : No  2) Have you actually had any thoughts of killing yourself? : No  3) Have you been thinking about how you might kill yourself? : No  5) Have you started to work out or worked out the details of how to kill yourself?  Do you intend to carry out this plan? : No  6) Have you ever done anything, started to do anything, or prepared to do anything to end your life?: No        EDUCATION:   Learner Progress Toward Treatment Goals: Reviewed results and recommendations of this team, Reviewed group plan and strategies, Reviewed signs, symptoms and risk of self harm and violent behavior and Reviewed goals and plan of care    Method: Individual    Outcome: Verbalized understanding and Demonstrated Understanding    PATIENT GOALS: Medication change, stay sober    PLAN/TREATMENT RECOMMENDATIONS UPDATE:  1. How are you progressing toward meeting your main treatment goal?   2. Are there discharge barriers/lingering problems that need to be addressed? None identified. Follow up is completed with [facility]. Pt would like referral to [facility] for vivotrol. WIll go to PCP for psych meds. 3.  Do you have the ability to pay for your medications? [employer]      4. How is your group participation?  Attending with participation      GOALS UPDATE:   Time frame for Short-Term Goals: Daily      BRANDO Hughes There are no Wet Read(s) to document.

## 2024-12-25 NOTE — H&P ADULT - PROBLEM SELECTOR PLAN 1
-admit to GYN   -pt recommended for laparopscopy with possible right salpingectomy, possible oophorectomy, possible open. pt explained risks and indications of procedure at this time. Pt verbalized understanding and agrees to the procedure, informed consent signed.  -OR team made aware   -PRBC x 2   -pt seen with Dr. Hidalgo

## 2024-12-25 NOTE — DISCHARGE NOTE PROVIDER - CARE PROVIDER_API CALL
Teddy Leija  Obstetrics and Gynecology  [address], [address]  [place] [address]  Phone: [phone]  Fax: [phone]  Follow Up Time: 2 weeks

## 2024-12-25 NOTE — ED PROVIDER NOTE - NEUROLOGICAL, MLM
R/O Altered mental status, unspecified altered mental status type
Alert and oriented, no focal deficits, no motor or sensory deficits.

## 2024-12-25 NOTE — BRIEF OPERATIVE NOTE - NSICDXBRIEFPOSTOP_GEN_ALL_CORE_FT
POST-OP DIAGNOSIS:  Ruptured tubal pregnancy of right fallopian tube 25-Dec-2024 18:00:53  Ernestina Murcia

## 2024-12-25 NOTE — ED PROVIDER NOTE - OBJECTIVE STATEMENT
34-year-old female LMP November 8, 2024, G2P 1.  Patient states at 3 AM she developed lower abdominal tenderness patient points to mid abdominal area.  Patient states she had hCG of  2302 days ago by her OB/GYN.  Patient has not had ultrasound yet.  No reported vaginal bleeding.  No nausea, no vomiting, no fever.  Patient also states has had nonproductive coughing for 3 days.  No chest pain, no shortness of breath, no calf tenderness.  Meds:  lamotrigine, levothyroxine   last seizure was over 8 years ago.

## 2024-12-25 NOTE — DISCHARGE NOTE PROVIDER - NSDCMRMEDTOKEN_GEN_ALL_CORE_FT
ferrous sulfate 300 mg/5 mL (60 mg/5 mL elemental iron) oral liquid: 5 milliliter(s) orally 2 times a day MDD: 2  ibuprofen 600 mg oral tablet: 1 tab(s) orally every 6 hours MDD: 4  lamoTRIgine 250 mg oral tablet, extended release: 1 tab(s) orally 2 times a day  levothyroxine 100 mcg (0.1 mg) oral tablet: 1 tab(s) orally once a day

## 2024-12-25 NOTE — ED ADULT TRIAGE NOTE - CHIEF COMPLAINT QUOTE
Patient reports lower abdominal pain started at 3AM today. Patient states she is 5 weeks pregnant, LMP 11/9/24, denies vaginal bleeding. Patient reports cough for 3 days and denies fever,

## 2024-12-25 NOTE — H&P ADULT - HISTORY OF PRESENT ILLNESS
34 year old  at 6 weeks by LMP  34 year old  at 6 weeks by LMP 2024, presents to ER with complaint of abdominal pain since 3am this morning. Pt states she was sleeping and had sudden onset of right lower quadrant pain which woke her up from sleep. Pt states she had pain 8/10 when she arrived to ER. After given IV tylenol she still feels pain although improved. Pt statse she feel pain at this time 3/10 and is worse with movement. Pt denies vaginal bleeding, chest pain, sob, dizziness, palpitations  or any other concerns.     PNC: Dr. Leija, first visit last week, BHCG was 2300  Pob/gynHx:   10/2023, , full term, uncomplicated   PCOS, menses regular since delivery of baby   sexually active with 1 parther   Allergies: NKA  Meds: Lamotrigine 250mg twice daily, synthroid 100mg daily  PMHx: epilepsy (last seizure 4 years ago), hypothyroidism   PSHx: denies   PsocHx: denies x 3

## 2024-12-25 NOTE — H&P ADULT - NSHPLABSRESULTS_GEN_ALL_CORE
11.3   7.52  )-----------( 294      ( 25 Dec 2024 05:38 )             33.3       < from: US Transvaginal, OB (24 @ 08:39) >    INTERPRETATION:  CLINICAL INFORMATION: Lower abdominal pain, rule out   ectopic pregnancy    LMP: 2024  HC on 2024    Estimated Gestational Age by LMP: 6 weeks and 4 days    COMPARISON: Pelvic ultrasound 2023    Endovaginal and transabdominal pelvic sonogram. Color and Spectral   Dopplerwas performed.    FINDINGS:    Uterus: Measures 8.8 x 4.9 x 5.8 cm, anteverted. Rounded focus at the   cervix measures 1.6 x 1.5 x 1.8 cm, possibly a cervical fibroid or   complex nabothian cyst.    Endometrium: 21 mm in thickness. No IUP is identified.    Right ovary: Measures 4.6 cm x 3.1 cm x 2.8 cm. Vascular flow the right   ovary is identified. Enlarged masslike appearance of the right   adnexa/fallopian tube for which underlying ectopic pregnancy suspected. A   2.0 x 1.9 x 2.1 cm cm hypoechoic structure associated with the right   ovary and adnexa, unclear if this represents a corpus luteal cyst or   ectopic pregnancy. Additional 1.5 x 1.2 x 1.3 cm right adnexal hypoechoic   structure which may represent an ectopic pregnancy.    Left ovary: Measures 1.7 cm x 2.7 cm x 2.0 cm. Vascular flow evaluation   is limited due to suboptimal position of the ovary.    Fluid: Trace fluid with internal echoes suggesting complexity, noted in   the cul-de-sac, nonspecific.    IMPRESSION:    No IUP is identified.    Suspected right adnexal ectopic pregnancy. Enlarged masslike appearance   of the right adnexa/fallopian tube for which underlying ectopic pregnancy   is suspected. A 2.0 x 1.9 x 2.1 cm cm hypoechoic structure associated   with the right ovary and adnexa, unclear if this represents a corpus   luteal cyst or ectopic pregnancy. Additional 1.5 x 1.2 x 1.3 cm right   adnexal hypoechoic structure which may represent an ectopic pregnancy.   GYN evaluation is recommended. Follow to resolution. Dr. Newman   discussed these findings with Dr. Oh from ER on 2024 at 8:58   AM, with read back.    Limited visualization and vascular flow evaluation of the left ovary due   to suboptimal position of the ovary resulting in poor sonographic window.    --- End of Report ---    < end of copied text >

## 2024-12-25 NOTE — ED ADULT NURSE NOTE - OBJECTIVE STATEMENT
patient c/o abdominal pain x today. Endorses being 5 weeks pregnant, denies bleeding. Denies chest pain, dizziness, headache, nausea and vomiting. no SOB.

## 2024-12-25 NOTE — CHART NOTE - NSCHARTNOTEFT_GEN_A_CORE
[facility] OP NOTE     Patient seen at bedside, resting comfortably offers no new complaints. Ambulating, voiding, tolerating regular diet.  Denies HA, CP, SOB, N/V/D, dizziness, palpitations, worsening abdominal pain, worsening vaginal bleeding, or any other concerns.    Vital Signs Last 24 Hrs  T(C): 36.6 (25 Dec 2024 15:53), Max: 36.9 (25 Dec 2024 12:00)  T(F): 97.8 (25 Dec 2024 15:53), Max: 98.4 (25 Dec 2024 12:00)  HR: 77 (25 Dec 2024 15:53) (74 - 97)  BP: 117/77 (25 Dec 2024 15:53) (104/71 - 131/71)  BP(mean): 76 (25 Dec 2024 15:17) (76 - 92)  RR: 18 (25 Dec 2024 15:53) (16 - 22)  SpO2: 99% (25 Dec 2024 15:53) (98% - 100%)    Parameters below as of 25 Dec 2024 15:53  Patient On (Oxygen Delivery Method): room air        Gen: A&O x 3, NAD  Chest: CTABL  Cardiac: S1+S2+ RRR  Breast: Soft, nontender, nonengorged  Abdomen: +BS; soft; NT; ND; Dressing C/D/I  Gyn: Minimal lochia  Ext: Nontender, DTRS 2+, no worsening edema, venodynes intact                          11.3   7.52  )-----------( 294      ( 25 Dec 2024 05:38 )             33.3       A/P: 34 year old POD #0 s/p laparoscopic right salpingectomy for ruptured right ectopic pregnancy        -Pain management as needed  -Advance as per protocol  -OOB and ambulate  -discharge home today   -pt seen with Dr. Hidalgo

## 2024-12-25 NOTE — ED PROVIDER NOTE - CLINICAL SUMMARY MEDICAL DECISION MAKING FREE TEXT BOX
Follow-up ultrasound and labs/diagnostics.  Patient no acute distress  Signout to oncoming ED attending Dr. Oh

## 2024-12-25 NOTE — DISCHARGE NOTE NURSING/CASE MANAGEMENT/SOCIAL WORK - FINANCIAL ASSISTANCE
[facility] provides services at a reduced cost to those who are determined to be eligible through [facility]’s financial assistance program. Information regarding [facility]’s financial assistance program can be found by going to [email or web address] or by calling [phone].

## 2024-12-25 NOTE — H&P ADULT - NSHPPHYSICALEXAM_GEN_ALL_CORE
Gen: AAOx3, resting comfortably   Abd: tender to palpation of right lower quadrant, +guarding   pelvic: no bleeding, refused   Ext: no edema

## 2024-12-26 LAB
CULTURE RESULTS: SIGNIFICANT CHANGE UP
SPECIMEN SOURCE: SIGNIFICANT CHANGE UP

## 2025-01-02 LAB — SURGICAL PATHOLOGY STUDY: SIGNIFICANT CHANGE UP
